# Patient Record
Sex: FEMALE | URBAN - METROPOLITAN AREA
[De-identification: names, ages, dates, MRNs, and addresses within clinical notes are randomized per-mention and may not be internally consistent; named-entity substitution may affect disease eponyms.]

---

## 2021-11-16 ENCOUNTER — APPOINTMENT (RX ONLY)
Dept: URBAN - METROPOLITAN AREA CLINIC 17 | Facility: CLINIC | Age: 66
Setting detail: DERMATOLOGY
End: 2021-11-16

## 2021-11-16 DIAGNOSIS — L08.9 LOCAL INFECTION OF THE SKIN AND SUBCUTANEOUS TISSUE, UNSPECIFIED: ICD-10-CM | Status: STABLE

## 2021-11-16 PROCEDURE — ? BLEACH BATH INSTRUCTIONS

## 2021-11-16 PROCEDURE — ? TREATMENT REGIMEN

## 2021-11-16 PROCEDURE — 99203 OFFICE O/P NEW LOW 30 MIN: CPT

## 2021-11-16 NOTE — PROCEDURE: TREATMENT REGIMEN
Detail Level: Simple
Plan: If abscesses continue to recur after a month of bleach baths, patient to contact office to get started on doxycycline
Continue Regimen: Existing prescription of Trimethoprim-Sulfamethozaole until completed

## 2021-11-16 NOTE — HPI: INFECTION (ABSCESS)
How Severe Is It?: moderate
Is This A New Presentation, Or A Follow-Up?: Infection
Additional History: Patient had been treated on multiple courses of antibiotics including: Trimethoprim-Sulfamethozaole, Cephalexin, and Amoixicillin-Clavulanate. She was also seen in ER. Blood culture done which came back negative. She states she was treated with IV antibiotics.

## 2021-11-16 NOTE — PROCEDURE: BLEACH BATH INSTRUCTIONS
[facility]: I recommended weekly bleach baths 3 times a week. This is accomplished by filling a bathtub with warm  water and then adding in 1/2 cup of bleach. The patient should then soak for 15-20 minutes and then rinse off.
Detail Level: Generalized